# Patient Record
Sex: MALE | Race: WHITE | NOT HISPANIC OR LATINO | Employment: FULL TIME | URBAN - NONMETROPOLITAN AREA
[De-identification: names, ages, dates, MRNs, and addresses within clinical notes are randomized per-mention and may not be internally consistent; named-entity substitution may affect disease eponyms.]

---

## 2023-10-25 ENCOUNTER — HOSPITAL ENCOUNTER (EMERGENCY)
Facility: HOSPITAL | Age: 53
Discharge: HOME | End: 2023-10-25
Attending: EMERGENCY MEDICINE
Payer: COMMERCIAL

## 2023-10-25 VITALS
HEIGHT: 69 IN | DIASTOLIC BLOOD PRESSURE: 88 MMHG | BODY MASS INDEX: 21.48 KG/M2 | HEART RATE: 74 BPM | RESPIRATION RATE: 17 BRPM | OXYGEN SATURATION: 99 % | SYSTOLIC BLOOD PRESSURE: 138 MMHG | WEIGHT: 145 LBS | TEMPERATURE: 98 F

## 2023-10-25 DIAGNOSIS — R03.0 ELEVATED BLOOD PRESSURE READING: ICD-10-CM

## 2023-10-25 DIAGNOSIS — S41.111A LACERATION OF RIGHT UPPER EXTREMITY, INITIAL ENCOUNTER: Primary | ICD-10-CM

## 2023-10-25 PROCEDURE — 90715 TDAP VACCINE 7 YRS/> IM: CPT | Performed by: EMERGENCY MEDICINE

## 2023-10-25 PROCEDURE — 12002 RPR S/N/AX/GEN/TRNK2.6-7.5CM: CPT | Performed by: EMERGENCY MEDICINE

## 2023-10-25 PROCEDURE — 90471 IMMUNIZATION ADMIN: CPT | Performed by: EMERGENCY MEDICINE

## 2023-10-25 PROCEDURE — 2500000005 HC RX 250 GENERAL PHARMACY W/O HCPCS: Performed by: EMERGENCY MEDICINE

## 2023-10-25 PROCEDURE — 2500000004 HC RX 250 GENERAL PHARMACY W/ HCPCS (ALT 636 FOR OP/ED): Performed by: EMERGENCY MEDICINE

## 2023-10-25 PROCEDURE — 99283 EMERGENCY DEPT VISIT LOW MDM: CPT | Mod: 25 | Performed by: EMERGENCY MEDICINE

## 2023-10-25 RX ORDER — LIDOCAINE HYDROCHLORIDE 10 MG/ML
5 INJECTION INFILTRATION; PERINEURAL ONCE
Status: COMPLETED | OUTPATIENT
Start: 2023-10-25 | End: 2023-10-25

## 2023-10-25 RX ORDER — LIDOCAINE HYDROCHLORIDE 10 MG/ML
INJECTION INFILTRATION; PERINEURAL
Status: COMPLETED
Start: 2023-10-25 | End: 2023-10-25

## 2023-10-25 RX ADMIN — TETANUS TOXOID, REDUCED DIPHTHERIA TOXOID AND ACELLULAR PERTUSSIS VACCINE, ADSORBED 0.5 ML: 5; 2.5; 8; 8; 2.5 SUSPENSION INTRAMUSCULAR at 06:55

## 2023-10-25 RX ADMIN — LIDOCAINE HYDROCHLORIDE 5 ML: 10 INJECTION INFILTRATION; PERINEURAL at 05:30

## 2023-10-25 RX ADMIN — LIDOCAINE HYDROCHLORIDE 5 ML: 10 INJECTION, SOLUTION INFILTRATION; PERINEURAL at 05:30

## 2023-10-25 ASSESSMENT — COLUMBIA-SUICIDE SEVERITY RATING SCALE - C-SSRS
1. IN THE PAST MONTH, HAVE YOU WISHED YOU WERE DEAD OR WISHED YOU COULD GO TO SLEEP AND NOT WAKE UP?: NO
6. HAVE YOU EVER DONE ANYTHING, STARTED TO DO ANYTHING, OR PREPARED TO DO ANYTHING TO END YOUR LIFE?: NO
2. HAVE YOU ACTUALLY HAD ANY THOUGHTS OF KILLING YOURSELF?: NO

## 2023-10-25 ASSESSMENT — PAIN SCALES - GENERAL
PAINLEVEL_OUTOF10: 2
PAINLEVEL_OUTOF10: 2

## 2023-10-25 ASSESSMENT — PAIN - FUNCTIONAL ASSESSMENT: PAIN_FUNCTIONAL_ASSESSMENT: 0-10

## 2023-10-25 ASSESSMENT — PAIN DESCRIPTION - LOCATION: LOCATION: ARM

## 2023-10-25 ASSESSMENT — PAIN DESCRIPTION - ORIENTATION: ORIENTATION: RIGHT

## 2023-10-25 ASSESSMENT — PAIN DESCRIPTION - DESCRIPTORS: DESCRIPTORS: SORE

## 2023-10-25 ASSESSMENT — PAIN DESCRIPTION - ONSET: ONSET: SUDDEN

## 2023-10-25 ASSESSMENT — PAIN DESCRIPTION - FREQUENCY: FREQUENCY: CONSTANT/CONTINUOUS

## 2023-10-25 ASSESSMENT — PAIN DESCRIPTION - PROGRESSION: CLINICAL_PROGRESSION: NOT CHANGED

## 2023-10-25 NOTE — Clinical Note
Sarah De La Paz was seen and treated in our emergency department on 10/25/2023.  He may return to work on 10/25/2023.  Keep the wound covered at work until stitches are removed.     If you have any questions or concerns, please don't hesitate to call.      Shruti Colindres MD

## 2023-10-25 NOTE — DISCHARGE INSTRUCTIONS
Your blood pressure was slightly elevated when you got here today.   It is important that you get that rechecked at your follow-up. You also need to call the referral line to set yourself up with a primary care doctor.      Keep the wound clean and protected from dirt and injury.  It is okay to shower.    Keep the wound covered when at work or in dirty circumstances to prevent contamination.  It is okay to leave the wound uncovered when in a clean environment.    Return to the emergency department for fever, redness, drainage, red streaks going up from the wound.

## 2023-10-25 NOTE — ED PROVIDER NOTES
HPI   Chief Complaint   Patient presents with    Laceration     Right forearm.  Dressing intact on arrival         History provided by:  Patient   used: No      Patient presents to the emergency department ambulatory via private vehicle for evaluation of right forearm laceration.  Patient reports that he inadvertently collided with the sharp end of a piece of metal while at work causing a laceration.  Bleeding has been controlled.  No numbness or tingling.  Its been more than 5 years since his last tetanus immunization.  He states it was an intact piece of metal.  Does not believe there are foreign bodies.  He is right-handed.  No chronic health problems.  On no daily medications.                  Abad Coma Scale Score: 15                  Patient History   No past medical history on file.  No past surgical history on file.  No family history on file.  Social History     Tobacco Use    Smoking status: Every Day     Packs/day: 1     Types: Cigarettes    Smokeless tobacco: Not on file   Vaping Use    Vaping Use: Never used   Substance Use Topics    Alcohol use: Yes     Comment: rarely    Drug use: Never       Physical Exam   ED Triage Vitals [10/25/23 0517]   Temp Heart Rate Resp BP   36.7 °C (98 °F) 93 16 (!) 183/91      SpO2 Temp Source Heart Rate Source Patient Position   95 % Tympanic -- Sitting      BP Location FiO2 (%)     Left arm --       Physical Exam  Vitals reviewed.   Constitutional:       Appearance: Normal appearance. He is normal weight.   Cardiovascular:      Rate and Rhythm: Normal rate and regular rhythm.      Pulses: Normal pulses.   Musculoskeletal:         General: Signs of injury present. Normal range of motion.      Comments: See skin exam, normal ROM   Skin:     General: Skin is warm and dry.      Capillary Refill: Capillary refill takes less than 2 seconds.      Comments: 4 cm, jagged, gaping, superficial laceration mid dorsal right forearm   Neurological:      General:  No focal deficit present.      Mental Status: He is alert.   Psychiatric:         Mood and Affect: Mood normal.         ED Course & MDM   Diagnoses as of 10/25/23 0555   Laceration of right upper extremity, initial encounter   Elevated blood pressure reading       Medical Decision Making    Presents to the emergency department with the above history and physical.  No evidence of foreign body, neurovascular injury, tendon injury.  Tetanus updated.  Wound cleaned and repaired by me.  Bacitracin dressing applied by nursing staff.  Wound care instructions provided.    Patient presented to the emergency room with asymptomatic hypertension. There is no evidence of end organ dysfunction. There is no indication to acutely lower the blood pressure. Importance of follow-up was strongly stressed.    Results of exam and any testing were discussed with patient/family. To the best of my ability, I answered all questions. At this time, there is no indication for admission/transfer or further diagnostic testing. Patient understands to return for any new or worsening symptoms, or failure to improve as anticipated. The importance of follow-up was stressed.    Procedure  Laceration Repair    Performed by: Shruti Colindres MD  Authorized by: Shruti Colindres MD    Consent:     Consent obtained:  Verbal    Consent given by:  Patient    Risks, benefits, and alternatives were discussed: yes      Risks discussed:  Infection, pain, retained foreign body, poor cosmetic result and poor wound healing    Alternatives discussed:  No treatment (nonsurgical closure)  Universal protocol:     Procedure explained and questions answered to patient or proxy's satisfaction: yes      Patient identity confirmed:  Verbally with patient  Anesthesia:     Anesthesia method:  Local infiltration    Local anesthetic:  Lidocaine 1% w/o epi  Laceration details:     Location:  Shoulder/arm    Shoulder/arm location:  R lower arm    Length (cm):  4    Depth (mm):   2  Pre-procedure details:     Preparation:  Patient was prepped and draped in usual sterile fashion  Exploration:     Limited defect created (wound extended): no      Hemostasis achieved with:  Direct pressure    Wound exploration: wound explored through full range of motion and entire depth of wound visualized      Wound extent: areolar tissue violated      Wound extent: no fascia violation noted, no foreign bodies/material noted, no muscle damage noted, no nerve damage noted, no tendon damage noted, no underlying fracture noted and no vascular damage noted      Contaminated: no    Treatment:     Area cleansed with:  Shur-Clens    Amount of cleaning:  Standard    Visualized foreign bodies/material removed: no      Debridement:  None    Undermining:  None    Scar revision: no    Skin repair:     Repair method:  Sutures    Suture size:  4-0    Suture material:  Nylon    Suture technique:  Simple interrupted    Number of sutures:  6  Approximation:     Approximation:  Close  Repair type:     Repair type:  Simple  Post-procedure details:     Dressing:  Antibiotic ointment and non-adherent dressing    Procedure completion:  Tolerated well, no immediate complications        ED Course & MDM   Diagnoses as of 10/25/23 0575   Laceration of right upper extremity, initial encounter   Elevated blood pressure reading        Shruti Colindres MD  10/25/23 0549

## 2024-09-20 ENCOUNTER — HOSPITAL ENCOUNTER (EMERGENCY)
Facility: HOSPITAL | Age: 54
Discharge: HOME | End: 2024-09-20
Payer: COMMERCIAL

## 2024-09-20 ENCOUNTER — APPOINTMENT (OUTPATIENT)
Dept: RADIOLOGY | Facility: HOSPITAL | Age: 54
End: 2024-09-20
Payer: COMMERCIAL

## 2024-09-20 VITALS
DIASTOLIC BLOOD PRESSURE: 84 MMHG | TEMPERATURE: 98.2 F | WEIGHT: 140 LBS | SYSTOLIC BLOOD PRESSURE: 142 MMHG | HEIGHT: 69 IN | BODY MASS INDEX: 20.73 KG/M2 | OXYGEN SATURATION: 99 % | HEART RATE: 86 BPM | RESPIRATION RATE: 18 BRPM

## 2024-09-20 DIAGNOSIS — L03.012 CELLULITIS OF THUMB, LEFT: Primary | ICD-10-CM

## 2024-09-20 LAB
HOLD SPECIMEN: NORMAL

## 2024-09-20 PROCEDURE — 73130 X-RAY EXAM OF HAND: CPT | Mod: RT

## 2024-09-20 PROCEDURE — 2500000004 HC RX 250 GENERAL PHARMACY W/ HCPCS (ALT 636 FOR OP/ED): Performed by: EMERGENCY MEDICINE

## 2024-09-20 PROCEDURE — 96374 THER/PROPH/DIAG INJ IV PUSH: CPT

## 2024-09-20 PROCEDURE — 99284 EMERGENCY DEPT VISIT MOD MDM: CPT | Mod: 25

## 2024-09-20 PROCEDURE — 36415 COLL VENOUS BLD VENIPUNCTURE: CPT | Performed by: EMERGENCY MEDICINE

## 2024-09-20 PROCEDURE — 2500000004 HC RX 250 GENERAL PHARMACY W/ HCPCS (ALT 636 FOR OP/ED)

## 2024-09-20 PROCEDURE — 73130 X-RAY EXAM OF HAND: CPT | Mod: RIGHT SIDE | Performed by: RADIOLOGY

## 2024-09-20 RX ORDER — SODIUM CHLORIDE 9 MG/ML
INJECTION, SOLUTION INTRAVENOUS
Status: COMPLETED
Start: 2024-09-20 | End: 2024-09-20

## 2024-09-20 RX ORDER — SULFAMETHOXAZOLE AND TRIMETHOPRIM 800; 160 MG/1; MG/1
1 TABLET ORAL 2 TIMES DAILY
Qty: 28 TABLET | Refills: 0 | Status: SHIPPED | OUTPATIENT
Start: 2024-09-20 | End: 2024-10-04

## 2024-09-20 RX ORDER — AMOXICILLIN AND CLAVULANATE POTASSIUM 875; 125 MG/1; MG/1
1 TABLET, FILM COATED ORAL EVERY 12 HOURS
Qty: 20 TABLET | Refills: 0 | Status: SHIPPED | OUTPATIENT
Start: 2024-09-20 | End: 2024-09-30

## 2024-09-20 RX ORDER — CEFAZOLIN 1 G/1
INJECTION, POWDER, FOR SOLUTION INTRAVENOUS
Status: COMPLETED
Start: 2024-09-20 | End: 2024-09-20

## 2024-09-20 RX ORDER — KETOROLAC TROMETHAMINE 30 MG/ML
30 INJECTION, SOLUTION INTRAMUSCULAR; INTRAVENOUS ONCE
Status: COMPLETED | OUTPATIENT
Start: 2024-09-20 | End: 2024-09-20

## 2024-09-20 RX ORDER — CEFAZOLIN SODIUM 2 G/50ML
2 SOLUTION INTRAVENOUS ONCE
Status: DISCONTINUED | OUTPATIENT
Start: 2024-09-20 | End: 2024-09-20 | Stop reason: HOSPADM

## 2024-09-20 ASSESSMENT — COLUMBIA-SUICIDE SEVERITY RATING SCALE - C-SSRS
6. HAVE YOU EVER DONE ANYTHING, STARTED TO DO ANYTHING, OR PREPARED TO DO ANYTHING TO END YOUR LIFE?: NO
1. IN THE PAST MONTH, HAVE YOU WISHED YOU WERE DEAD OR WISHED YOU COULD GO TO SLEEP AND NOT WAKE UP?: NO
2. HAVE YOU ACTUALLY HAD ANY THOUGHTS OF KILLING YOURSELF?: NO

## 2024-09-20 ASSESSMENT — PAIN DESCRIPTION - PAIN TYPE: TYPE: ACUTE PAIN

## 2024-09-20 ASSESSMENT — PAIN - FUNCTIONAL ASSESSMENT: PAIN_FUNCTIONAL_ASSESSMENT: 0-10

## 2024-09-20 ASSESSMENT — PAIN DESCRIPTION - PROGRESSION: CLINICAL_PROGRESSION: NOT CHANGED

## 2024-09-20 ASSESSMENT — PAIN SCALES - GENERAL: PAINLEVEL_OUTOF10: 5 - MODERATE PAIN

## 2024-09-20 ASSESSMENT — PAIN DESCRIPTION - ORIENTATION: ORIENTATION: RIGHT

## 2024-09-20 NOTE — ED PROVIDER NOTES
HPI   Chief Complaint   Patient presents with    Animal Bite       Provoked attack.  Patient got his hand to close to his dog and the dog bit him few times on the thumb.  Thumb injury occurred Tuesday.  This morning the thumb is red and swollen and there are some pustular areas draining.  Limited range of motion.            Patient History   History reviewed. No pertinent past medical history.  History reviewed. No pertinent surgical history.  No family history on file.  Social History     Tobacco Use    Smoking status: Every Day     Current packs/day: 1.00     Types: Cigarettes    Smokeless tobacco: Never   Vaping Use    Vaping status: Never Used   Substance Use Topics    Alcohol use: Yes     Comment: rarely    Drug use: Never       Physical Exam   ED Triage Vitals [09/20/24 0453]   Temperature Heart Rate Respirations BP   36.3 °C (97.4 °F) 87 16 157/87      SpO2 Temp src Heart Rate Source Patient Position   99 % -- -- --      BP Location FiO2 (%)     -- --       Physical Exam  Vitals and nursing note reviewed.   HENT:      Head: Normocephalic and atraumatic.      Right Ear: Tympanic membrane and ear canal normal.      Left Ear: Tympanic membrane and ear canal normal.      Nose: Nose normal.      Mouth/Throat:      Mouth: Mucous membranes are moist.   Cardiovascular:      Rate and Rhythm: Normal rate.   Pulmonary:      Effort: Pulmonary effort is normal.      Breath sounds: Normal breath sounds.   Abdominal:      General: Abdomen is flat.      Palpations: Abdomen is soft.   Musculoskeletal:         General: Swelling present. Normal range of motion.      Cervical back: Normal range of motion.      Comments: Right thumb is swollen and difficult to bend.  There are a couple areas for puncture wounds eventuated a pustular drainage.  Small paronychia him as well.  Overall, however, the thumb is warm and perfused.   Skin:     General: Skin is warm and dry.   Neurological:      General: No focal deficit present.       Mental Status: He is alert.           ED Course & MDM   Diagnoses as of 09/20/24 0540   Cellulitis of thumb, left                 No data recorded                                 Medical Decision Making  I have started the patient on antibiotics starting with a dose of Ancef and Toradol through the IV for pain control.  I have given him names of hand surgeons that he should try to see in follow-up since this may end up needing an I&D.        Procedure  Procedures     Micheal Hernandez MD  09/20/24 0528       Micheal Hernandez MD  09/20/24 0565

## 2024-09-24 ENCOUNTER — APPOINTMENT (OUTPATIENT)
Dept: ORTHOPEDIC SURGERY | Facility: CLINIC | Age: 54
End: 2024-09-24
Payer: COMMERCIAL

## 2024-09-26 ENCOUNTER — HOSPITAL ENCOUNTER (OUTPATIENT)
Facility: HOSPITAL | Age: 54
Setting detail: OUTPATIENT SURGERY
Discharge: HOME | End: 2024-09-26
Attending: ORTHOPAEDIC SURGERY | Admitting: ORTHOPAEDIC SURGERY
Payer: COMMERCIAL

## 2024-09-26 ENCOUNTER — OFFICE VISIT (OUTPATIENT)
Dept: ORTHOPEDIC SURGERY | Facility: HOSPITAL | Age: 54
End: 2024-09-26
Payer: COMMERCIAL

## 2024-09-26 VITALS
HEART RATE: 68 BPM | HEIGHT: 69 IN | OXYGEN SATURATION: 97 % | WEIGHT: 146.61 LBS | RESPIRATION RATE: 18 BRPM | DIASTOLIC BLOOD PRESSURE: 88 MMHG | SYSTOLIC BLOOD PRESSURE: 145 MMHG | TEMPERATURE: 98.1 F | BODY MASS INDEX: 21.71 KG/M2

## 2024-09-26 DIAGNOSIS — L02.511 ABSCESS OF RIGHT THUMB: Primary | ICD-10-CM

## 2024-09-26 PROCEDURE — 3600000008 HC OR TIME - EACH INCREMENTAL 1 MINUTE - PROCEDURE LEVEL THREE: Performed by: ORTHOPAEDIC SURGERY

## 2024-09-26 PROCEDURE — 2500000004 HC RX 250 GENERAL PHARMACY W/ HCPCS (ALT 636 FOR OP/ED): Performed by: ORTHOPAEDIC SURGERY

## 2024-09-26 PROCEDURE — 99213 OFFICE O/P EST LOW 20 MIN: CPT | Mod: 57 | Performed by: ORTHOPAEDIC SURGERY

## 2024-09-26 PROCEDURE — 87205 SMEAR GRAM STAIN: CPT | Mod: BEALAB | Performed by: PHYSICIAN ASSISTANT

## 2024-09-26 PROCEDURE — 3600000003 HC OR TIME - INITIAL BASE CHARGE - PROCEDURE LEVEL THREE: Performed by: ORTHOPAEDIC SURGERY

## 2024-09-26 PROCEDURE — 26011 DRAINAGE OF FINGER ABSCESS: CPT | Performed by: ORTHOPAEDIC SURGERY

## 2024-09-26 PROCEDURE — 7100000010 HC PHASE TWO TIME - EACH INCREMENTAL 1 MINUTE: Performed by: ORTHOPAEDIC SURGERY

## 2024-09-26 PROCEDURE — 2500000005 HC RX 250 GENERAL PHARMACY W/O HCPCS: Performed by: ORTHOPAEDIC SURGERY

## 2024-09-26 PROCEDURE — 99203 OFFICE O/P NEW LOW 30 MIN: CPT | Performed by: ORTHOPAEDIC SURGERY

## 2024-09-26 PROCEDURE — 7100000001 HC RECOVERY ROOM TIME - INITIAL BASE CHARGE: Performed by: ORTHOPAEDIC SURGERY

## 2024-09-26 PROCEDURE — 7100000002 HC RECOVERY ROOM TIME - EACH INCREMENTAL 1 MINUTE: Performed by: ORTHOPAEDIC SURGERY

## 2024-09-26 PROCEDURE — 26080 EXPLORE/TREAT FINGER JOINT: CPT | Performed by: ORTHOPAEDIC SURGERY

## 2024-09-26 PROCEDURE — 26020 DRAIN HAND TENDON SHEATH: CPT | Performed by: ORTHOPAEDIC SURGERY

## 2024-09-26 PROCEDURE — 7100000009 HC PHASE TWO TIME - INITIAL BASE CHARGE: Performed by: ORTHOPAEDIC SURGERY

## 2024-09-26 RX ORDER — LIDOCAINE HYDROCHLORIDE 10 MG/ML
INJECTION, SOLUTION INFILTRATION; PERINEURAL AS NEEDED
Status: DISCONTINUED | OUTPATIENT
Start: 2024-09-26 | End: 2024-09-26 | Stop reason: HOSPADM

## 2024-09-26 RX ORDER — AMOXICILLIN AND CLAVULANATE POTASSIUM 875; 125 MG/1; MG/1
1 TABLET, FILM COATED ORAL 2 TIMES DAILY
Qty: 14 TABLET | Refills: 0 | Status: SHIPPED | OUTPATIENT
Start: 2024-09-26 | End: 2024-10-03

## 2024-09-26 RX ORDER — SULFAMETHOXAZOLE AND TRIMETHOPRIM 800; 160 MG/1; MG/1
1 TABLET ORAL 2 TIMES DAILY
Qty: 14 TABLET | Refills: 0 | Status: SHIPPED | OUTPATIENT
Start: 2024-09-26 | End: 2024-10-03

## 2024-09-26 RX ORDER — TRAMADOL HYDROCHLORIDE 50 MG/1
50 TABLET ORAL EVERY 8 HOURS PRN
Qty: 15 TABLET | Refills: 0 | Status: SHIPPED | OUTPATIENT
Start: 2024-09-26 | End: 2024-10-01

## 2024-09-26 RX ORDER — BUPIVACAINE HYDROCHLORIDE 5 MG/ML
INJECTION, SOLUTION PERINEURAL AS NEEDED
Status: DISCONTINUED | OUTPATIENT
Start: 2024-09-26 | End: 2024-09-26 | Stop reason: HOSPADM

## 2024-09-26 ASSESSMENT — ENCOUNTER SYMPTOMS
FATIGUE: 0
CHILLS: 0
ARTHRALGIAS: 1
JOINT SWELLING: 1
FEVER: 0
WOUND: 1
SHORTNESS OF BREATH: 0
SINUS PRESSURE: 0
WHEEZING: 0

## 2024-09-26 ASSESSMENT — PAIN SCALES - GENERAL
PAINLEVEL_OUTOF10: 0 - NO PAIN

## 2024-09-26 ASSESSMENT — PAIN - FUNCTIONAL ASSESSMENT
PAIN_FUNCTIONAL_ASSESSMENT: 0-10

## 2024-09-26 ASSESSMENT — COLUMBIA-SUICIDE SEVERITY RATING SCALE - C-SSRS
2. HAVE YOU ACTUALLY HAD ANY THOUGHTS OF KILLING YOURSELF?: NO
6. HAVE YOU EVER DONE ANYTHING, STARTED TO DO ANYTHING, OR PREPARED TO DO ANYTHING TO END YOUR LIFE?: NO
1. IN THE PAST MONTH, HAVE YOU WISHED YOU WERE DEAD OR WISHED YOU COULD GO TO SLEEP AND NOT WAKE UP?: NO

## 2024-09-26 NOTE — OP NOTE
INCISION AND DRAINAGE, ABSCESS, RIGHT THUMB, FLEXOR TENDON SHEATH, AND IP JOINT (R) Operative Note     Date: 2024  OR Location: [facility]    Name: Sarah De La Paz, : 1970, Age: 54 y.o., MRN: [id number], Sex: male    Diagnosis  Pre-op Diagnosis      * Abscess of right thumb [L02.511] Post-op Diagnosis     * Abscess of right thumb [L02.511]     Procedures  INCISION AND DRAINAGE, ABSCESS, RIGHT THUMB, FLEXOR TENDON SHEATH, AND IP JOINT  41511 - MO DRAINAGE FINGER ABSCESS COMPLICATED  #1 irrigation debridement with irrigation flexor tendon sheath right thumb  #2 irrigation debridement right thumb interphalangeal joint  #3 irrigation debridement severe abscess of dorsum of thumb and pulp space    Surgeons      * Rex Mclean - Primary    Resident/Fellow/Other Assistant:  Surgeons and Role:  * No surgeons found with a matching role *  Lissett Chavira PA-C  Procedure Summary  Anesthesia: Anesthesia type not filed in the log.  ASA: ASA status not filed in the log.  Anesthesia Staff: No anesthesia staff entered.  Estimated Blood Loss: 5mL  Intra-op Medications: Administrations occurring from 1515 to 1555 on 24:  * No intraprocedure medications in log *           Anesthesia Record               Intraprocedure I/O Totals       None           Specimen:   ID Type Source Tests Collected by Time   A : RIGHT THUMB (1/2) Swab ABSCESS TISSUE/WOUND CULTURE/SMEAR Rex Mclean MD 2024 1608   B : RIGHT THUMB (2/2) Swab ABSCESS TISSUE/WOUND CULTURE/SMEAR Rex Mclean MD 2024 1609        Staff:   Circulator: Leyda  Scrub Person: Viviana  Scrub Person: Maame  Circulator: Danni         Drains and/or Catheters: * None in log *    Tourniquet Times:         Implants:     Findings: Severe infection    Indications: Sarah De La Paz is an 54 y.o. male who is having surgery for Abscess of right thumb [L02.511].  Pleasant gentleman comes in today and I had a long discussion with the patient  preoperatively.  He was seen up in the office and was adamant about going home to take care of his dog and cannot stay at the hospital I told him that not being on IV antibiotics can significantly affect his long-term outcome and care but he would not budge in this matter.  I decided that his best chance for a good long-term outcome is taking him to surgery immediately after clinic today for irrigation debridement and be as thorough as we can and then keep him on the oral antibiotics with daily soaks as discussed.  We will follow-up with him on Monday and the good news is I did not see any acute bone involvement nothing on the x-rays but he did have infection that was partially treated there may have been some early tenosynovitis of the thumb but most of this was a dorsal abscess and did extend into the IP joint but no significant damage at this point but the dorsal tendon and soft tissue sustained significant infectious damage and future area will demarcate whether future surgery will be needed.  Any significant constitutional symptoms and we discussed this with fevers problems like that or other issues with breathing or anything like that he should go to his local emergency room.    The patient was seen in the preoperative area. The risks, benefits, complications, treatment options, non-operative alternatives, expected recovery and outcomes were discussed with the patient. The possibilities of reaction to medication, pulmonary aspiration, injury to surrounding structures, bleeding, recurrent infection, the need for additional procedures, failure to diagnose a condition, and creating a complication requiring transfusion or operation were discussed with the patient. The patient concurred with the proposed plan, giving informed consent.  The site of surgery was properly noted/marked if necessary per policy. The patient has been actively warmed in preoperative area. Preoperative antibiotics are not indicated. Venous  thrombosis prophylaxis are not indicated.    Procedure Details: Aubrey gentleman brought the operating room after sterilely prepping draping and performing a timeout we placed a digital block to the thumb eventually did raise a forearm tourniquet but made our dorsal abscess incision going bluntly through the pulp space but most of it was dorsal and more with liquefaction necrosis tendon still intact but macerated there was a small arthrotomy in the joint but no gross purulence but we irrigate out the IP joint fully after opening the subcu and the distal extensor mechanism intact, thoroughly debrided the area out fully at this point with the area and the tooth entrance we made an incision back near the A1 pulley open this up and did a through and through irrigation it came out distally of the flexor tendon sheath no gross purulence just some increased fluid in the area nothing more proximal.  After irrigating out the flexor tendon sheath the dorsal abscess in the IP joint we again did copious chlorhexidine irrigation let down the tourniquet there was some sluggish refill but there was a vascular thumb but the dorsum had some necrosis.  At this point bulky dressing placed patient was given dressing change instructions and soak instructions again we will see him back on Monday any worsening he is to call immediately or go to the emergency room Lissett Chavira PA-C at the surgical assistant in this case and her assistance greatly reduced operative time and aided in performance of the case  Complications:  None; patient tolerated the procedure well.    Disposition: PACU - hemodynamically stable.  Condition: stable         Additional Details: 0    Attending Attestation: I performed the procedure.    Rex Mclean  Phone Number: [phone]

## 2024-09-26 NOTE — H&P (VIEW-ONLY)
Reason for Appointment  Chief Complaint   Patient presents with    Right Thumb - Pain     History of Present Illness  New patient is a 54 y.o. male here today for evaluation ofhis right thumb.  A week ago he has pitbull bit his thumb, he had increased pain and swelling, he was initially seen at [facility] emergency room.   He was put on oral antibiotics.  He has had increased redness, swelling, pain and drainage from the thumb since that time.  X-rays taken initially at the emergency room do not show any acute fracture. He is a current smoker.    History reviewed. No pertinent past medical history.    History reviewed. No pertinent surgical history.    Medication Documentation Review Audit       Reviewed by Haley Weir MA (Medical Assistant) on 09/26/24 at 1410      Medication Order Taking? Sig Documenting Provider Last Dose Status   amoxicillin-pot clavulanate (Augmentin) 875-125 mg tablet [id number] Yes Take 1 tablet by mouth every 12 hours for 10 days. Micheal Hernandez MD Taking Active   sulfamethoxazole-trimethoprim (Bactrim DS) 800-160 mg tablet [id number] Yes Take 1 tablet by mouth 2 times a day for 14 days. Micheal Hernandez MD Taking Active                    No Known Allergies    Review of Systems   Constitutional:  Negative for chills, fatigue and fever.   HENT:  Negative for nosebleeds, sinus pressure and tinnitus.    Respiratory:  Negative for shortness of breath and wheezing.    Cardiovascular:  Negative for chest pain and leg swelling.   Musculoskeletal:  Positive for arthralgias and joint swelling.   Skin:  Positive for wound. Negative for pallor and rash.     Exam   On exam patient is alert, awake, and in no acute distress.  Head is normocephalic, no JVD, no auditory wheezes.  He has good shoulder and elbow motion, significant swelling with purulent draining abscesses dorsally over the right thumb.  Unable to flex the thumb due to swelling.  Skin is warm and dry, without any other ulcerations.   Good pulses and sensation in the upper extremity.    Assessment   Encounter Diagnosis   Name Primary?    Abscess of right thumb Yes     Plan   We discussed quick operative intervention today, he has draining abscesses and could have involvement of the bone with osteomyelitis after a dog bite a week ago.  He understands the serious nature of this, he does live far away and he does not want to  to be admitted overnight with IV antibiotics.  We discussed at least I&D in the thumb under local this afternoon and he can do simple wound care over the weekend, we will follow-up with him on Monday at a closer location to check the wound.  He does understand there is a risk of continued infection and the possibility of needing an amputation in the future if this is not treated correctly.  We will proceed this afternoon with a right thumb I&D under local anesthesia.     Written by Lissett Mclean saw, evaluated, and treated the patient with the PA

## 2024-09-26 NOTE — LETTER
September 26, 2024     Patient: Sarah De La Paz   YOB: 1970   Date of Visit: 9/26/2024       To Whom It May Concern:    It is my medical opinion that Sarah De La Paz should remain out of work until further notice .    If you have any questions or concerns, please don't hesitate to call.         Sincerely,        Lissett Chavira PA-C

## 2024-09-26 NOTE — DISCHARGE INSTRUCTIONS
You had hand surgery today.  Due to local anesthesia of the area may be numb for a few hours.    Leave the bulky dressing on until tomorrow, at that point you can do the dilute bottled water and hydrogen peroxide soak.  Soak the thumb for 5 minutes and then allowed to air dry.  Put new clean dressing on thumb.    It is important to elevate the hand for the next few days, you can do some gentle thumb motion.    Continue on your oral antibiotics, new prescriptions were sent in for you to your pharmacy.    We should see you on Monday 9/30/24 at [facility] and the physician Gricelda Lombardi at 9 AM for a wound check.

## 2024-09-26 NOTE — NURSING NOTE
Pt. Arrived to PACU, pt alert and oriented. Received local injection to hand only, pt prepared for discharge.    1645-Patient in Phase 2; dressed and up to chair with RN assist. Tolerating po fluids, no complaint of pain and no complaint of nausea.     Friend at bedside; discussed discharge instructions with patient and Friend. All questions at this time answered.     Patient clinically appropriate for discharge.

## 2024-09-26 NOTE — PROGRESS NOTES
Reason for Appointment  Chief Complaint   Patient presents with    Right Thumb - Pain     History of Present Illness  New patient is a 54 y.o. male here today for evaluation ofhis right thumb.  A week ago he has pitbull bit his thumb, he had increased pain and swelling, he was initially seen at [facility] emergency room.   He was put on oral antibiotics.  He has had increased redness, swelling, pain and drainage from the thumb since that time.  X-rays taken initially at the emergency room do not show any acute fracture. He is a current smoker.    History reviewed. No pertinent past medical history.    History reviewed. No pertinent surgical history.    Medication Documentation Review Audit       Reviewed by Haley Weir MA (Medical Assistant) on 09/26/24 at 1410      Medication Order Taking? Sig Documenting Provider Last Dose Status   amoxicillin-pot clavulanate (Augmentin) 875-125 mg tablet [id number] Yes Take 1 tablet by mouth every 12 hours for 10 days. Micheal Hernandez MD Taking Active   sulfamethoxazole-trimethoprim (Bactrim DS) 800-160 mg tablet [id number] Yes Take 1 tablet by mouth 2 times a day for 14 days. Micheal Hernandez MD Taking Active                    No Known Allergies    Review of Systems   Constitutional:  Negative for chills, fatigue and fever.   HENT:  Negative for nosebleeds, sinus pressure and tinnitus.    Respiratory:  Negative for shortness of breath and wheezing.    Cardiovascular:  Negative for chest pain and leg swelling.   Musculoskeletal:  Positive for arthralgias and joint swelling.   Skin:  Positive for wound. Negative for pallor and rash.     Exam   On exam patient is alert, awake, and in no acute distress.  Head is normocephalic, no JVD, no auditory wheezes.  He has good shoulder and elbow motion, significant swelling with purulent draining abscesses dorsally over the right thumb.  Unable to flex the thumb due to swelling.  Skin is warm and dry, without any other ulcerations.   Good pulses and sensation in the upper extremity.    Assessment   Encounter Diagnosis   Name Primary?    Abscess of right thumb Yes     Plan   We discussed quick operative intervention today, he has draining abscesses and could have involvement of the bone with osteomyelitis after a dog bite a week ago.  He understands the serious nature of this, he does live far away and he does not want to  to be admitted overnight with IV antibiotics.  We discussed at least I&D in the thumb under local this afternoon and he can do simple wound care over the weekend, we will follow-up with him on Monday at a closer location to check the wound.  He does understand there is a risk of continued infection and the possibility of needing an amputation in the future if this is not treated correctly.  We will proceed this afternoon with a right thumb I&D under local anesthesia.     Written by Lissett Mclean saw, evaluated, and treated the patient with the PA

## 2024-09-28 LAB
BACTERIA SPEC CULT: ABNORMAL
GRAM STN SPEC: ABNORMAL
GRAM STN SPEC: ABNORMAL

## 2024-09-29 LAB
BACTERIA SPEC CULT: NORMAL
GRAM STN SPEC: NORMAL
GRAM STN SPEC: NORMAL

## 2024-09-30 ENCOUNTER — OFFICE VISIT (OUTPATIENT)
Dept: ORTHOPEDIC SURGERY | Facility: CLINIC | Age: 54
End: 2024-09-30
Payer: COMMERCIAL

## 2024-09-30 DIAGNOSIS — L02.511 ABSCESS OF RIGHT THUMB: Primary | ICD-10-CM

## 2024-09-30 PROCEDURE — 99024 POSTOP FOLLOW-UP VISIT: CPT | Performed by: ORTHOPAEDIC SURGERY

## 2024-09-30 ASSESSMENT — PAIN - FUNCTIONAL ASSESSMENT: PAIN_FUNCTIONAL_ASSESSMENT: 0-10

## 2024-09-30 ASSESSMENT — PAIN SCALES - GENERAL: PAINLEVEL_OUTOF10: 0 - NO PAIN

## 2024-09-30 NOTE — PROGRESS NOTES
Reason for Appointment  Chief Complaint   Patient presents with    Right Thumb - Post-op     History of Present Illness  Patient is here today 4 days s/p a right thumb I&D on 9/26/24. Patient is doing well overall.  He has been doing daily soaks and wound care.  Thumb looks much better today, less redness and swelling and no purulent drainage.  He is still on oral antibiotics.  He will continue simple wound care for the next week and he understands as the wound dries out and with less drainage, he can allow the wound to air out and scab over.  We will follow-up with him in 1 week for suture removal.    Assessment   Right thumb abscess

## 2024-10-04 LAB
BACTERIA SPEC CULT: ABNORMAL
GRAM STN SPEC: ABNORMAL
GRAM STN SPEC: ABNORMAL

## 2024-10-07 ENCOUNTER — APPOINTMENT (OUTPATIENT)
Dept: ORTHOPEDIC SURGERY | Facility: CLINIC | Age: 54
End: 2024-10-07
Payer: COMMERCIAL

## 2024-10-07 DIAGNOSIS — L02.511 ABSCESS OF RIGHT THUMB: Primary | ICD-10-CM

## 2024-10-07 PROCEDURE — 99024 POSTOP FOLLOW-UP VISIT: CPT | Performed by: ORTHOPAEDIC SURGERY

## 2024-10-07 ASSESSMENT — PAIN SCALES - GENERAL: PAINLEVEL_OUTOF10: 1

## 2024-10-07 ASSESSMENT — PAIN - FUNCTIONAL ASSESSMENT: PAIN_FUNCTIONAL_ASSESSMENT: 0-10

## 2024-10-07 NOTE — PROGRESS NOTES
Reason for Appointment  Chief Complaint   Patient presents with    Right Thumb - Post-op, Follow-up     History of Present Illness  Patient is here today 2 weeks s/p a right thumb I&D. Patient is doing well overall.  He has been doing simple wound care, wounds are healing nicely with no increasing signs of infection, redness and swelling has improved.  He is finishing out his oral antibiotics.  He will continue working on range of motion, not interested in any formal therapy and swelling should continue to improve.  We did discuss simple compression wrapping over the thumb.  He can return to work next week.  Any increase in redness, swelling, drainage he should follow-up with us immediately.    Assessment   Right thumb abscess